# Patient Record
Sex: FEMALE | Race: AMERICAN INDIAN OR ALASKA NATIVE
[De-identification: names, ages, dates, MRNs, and addresses within clinical notes are randomized per-mention and may not be internally consistent; named-entity substitution may affect disease eponyms.]

---

## 2018-02-14 ENCOUNTER — HOSPITAL ENCOUNTER (OUTPATIENT)
Dept: HOSPITAL 5 - VAS | Age: 53
Discharge: HOME | End: 2018-02-14
Attending: FAMILY MEDICINE
Payer: COMMERCIAL

## 2018-02-14 DIAGNOSIS — M79.89: ICD-10-CM

## 2018-02-14 DIAGNOSIS — M79.605: Primary | ICD-10-CM

## 2021-03-09 ENCOUNTER — HOSPITAL ENCOUNTER (EMERGENCY)
Dept: HOSPITAL 5 - ED | Age: 56
Discharge: HOME | End: 2021-03-09
Payer: COMMERCIAL

## 2021-03-09 VITALS — DIASTOLIC BLOOD PRESSURE: 88 MMHG | SYSTOLIC BLOOD PRESSURE: 132 MMHG

## 2021-03-09 DIAGNOSIS — R51.9: Primary | ICD-10-CM

## 2021-03-09 DIAGNOSIS — Z98.51: ICD-10-CM

## 2021-03-09 DIAGNOSIS — Z90.710: ICD-10-CM

## 2021-03-09 DIAGNOSIS — I10: ICD-10-CM

## 2021-03-09 DIAGNOSIS — Z79.899: ICD-10-CM

## 2021-03-09 DIAGNOSIS — E11.9: ICD-10-CM

## 2021-03-09 PROCEDURE — 99281 EMR DPT VST MAYX REQ PHY/QHP: CPT

## 2021-03-09 NOTE — EMERGENCY DEPARTMENT REPORT
ED Headache HPI





- General


Chief Complaint: Headache


Stated Complaint: HBP; LEFT HAND TINGLING


Time Seen by Provider: 03/09/21 01:34


Source: patient





- History of Present Illness


Initial Comments: 





This 55-year-old American female with a past medical history of hypertension 

presents emerged department worried about her blood pressure she states at home 

it was in the 180s of the 110s as she grew worried that her blood pressure 

medication is not working.  She took another dose of her triamterene and then 

began to have a little tingling sensation to her hands okay to emerge department

to have a blood pressure check to see what was going on a while here emerge 

department her symptoms totally had resolved.  She reports no presyncope, no 

loss of consciousness, no fever, chills, sweats, no chest pain, no palpitations.

 States that she feels well overall and an excellent this present time 

requesting to be discharged home.  States she will return to emergency 

department should she feel that her condition is worsening.


Timing/Duration: 4-6 hours


Quality: achy


Head Injury Location: global


Recent Head Trauma: occasional headaches


Associated Symptoms: denies: facial pain, fever/chills, nausea/vomiting, nasal 

congestion, nasal drainage, sinus infection, stiff neck, vision changes


Allergies/Adverse Reactions: 


Allergies





amoxicillin Allergy (Verified 11/25/18 16:11)


   Hives


avocado Allergy (Verified 11/25/18 16:11)


   Hives


banana Allergy (Verified 11/25/18 16:11)


   Hives


kiwi Allergy (Verified 11/25/18 16:11)


   Hives


lisinopril Allergy (Verified 11/25/18 16:11)


   Swelling


orange Allergy (Verified 11/25/18 16:11)


   Hives


Latex, Natural Rubber Adverse Reaction (Verified 11/25/18 16:11)


   Anaphylaxis


peanut Adverse Reaction (Verified 11/25/18 16:11)


   Anaphylaxis











ED Review of Systems


ROS: 


Stated complaint: HBP; LEFT HAND TINGLING


Other details as noted in HPI





Comment: All other systems reviewed and negative





ED Past Medical Hx





- Past Medical History


Previous Medical History?: Yes


Hx Hypertension: Yes


Hx Diabetes: Yes





- Surgical History


Past Surgical History?: Yes


Additional Surgical History: tubal ligation, hysterectomy





- Social History


Smoking Status: Never Smoker





ED Physical Exam





- General


Limitations: No Limitations


General appearance: alert, in no apparent distress





- Head


Head exam: Present: atraumatic, normocephalic





- Eye


Eye exam: Present: normal appearance, PERRL, EOMI.  Absent: nystagmus


Pupils: Present: other (Negative funduscopic examination)





- ENT


ENT exam: Present: normal exam, normal orophraynx, mucous membranes moist





- Neck


Neck exam: Present: normal inspection





- Respiratory


Respiratory exam: Present: normal lung sounds bilaterally.  Absent: respiratory 

distress





- Cardiovascular


Cardiovascular Exam: Present: regular rate, normal rhythm.  Absent: systolic 

murmur, diastolic murmur, rubs, gallop





- GI/Abdominal


GI/Abdominal exam: Present: soft, normal bowel sounds





- Extremities Exam


Extremities exam: Present: normal inspection





- Back Exam


Back exam: Present: normal inspection.  Absent: muscle spasm, paraspinal 

tenderness, vertebral tenderness





- Neurological Exam


Neurological exam: Present: alert, oriented X3, CN II-XII intact, normal gait, 

reflexes normal.  Absent: abnormal gait, motor sensory deficit





- Psychiatric


Psychiatric exam: Present: normal affect, normal mood.  Absent: anxious, flat 

affect





- Skin


Skin exam: Present: warm, dry, intact, normal color.  Absent: rash





ED Course





                                   Vital Signs











  03/09/21





  01:16


 


Temperature 97.8 F


 


Pulse Rate 77


 


Respiratory 18





Rate 


 


Blood Pressure 132/88


 


O2 Sat by Pulse 99





Oximetry 














ED Medical Decision Making





- Medical Decision Making





55-year-old female presents emerge department for hypertension was found to have

her blood pressure normal at 130s over the 80s.  With no accompanying symptoms. 

She takes triamterene HCTZ and reports he takes medication as prescribed but had

to take a dose tonight due to increase in her and her blood pressure states that

she feels normal at no point in time did she develop any other symptoms with 

exception of a vague tingling to her feet fingers which she thought was more 

secondary to the anxiety of her having hypertension.  I did offer CT scan of the

head however she she declined stating that she felt it was unnecessary due to 

her her symptoms or lack thereof and states she will return to the emergency 

department should she feel that it is necessary


Critical care attestation.: 


If time is entered above; I have spent that time in minutes in the direct care 

of this critically ill patient, excluding procedure time.








ED Disposition


Clinical Impression: 


 Cephalgia





Disposition: DC-01 TO HOME OR SELFCARE


Is pt being admited?: No


Does the pt Need Aspirin: No


Condition: Stable


Additional Instructions: 


Blood pressure is normal range.  Please follow-up to the hospital should your 

blood pressure increases again at home or becomes present with symptoms.  We do 

understand that you have your operative no CT scan at this present time but if 

your symptoms do reemerge you can return to obtain the CT.


Referrals: 


PRIMARY CARE,MD [Primary Care Provider] - 3-5 Days





- Level of Consciousness


1a. Level of Consciousness: alert/keenly responsive





- LOC Questions


1b. LOC Questions: answers both correctly





- LOC Command


1c. LOC Commands: performs tasks correctly





- Best Gaze


2. Best Gaze: normal





- Visual


3. Visual: no visual loss





- Facial Palsy


4. Facial Palsy: normal symmetrical movement





- Motor Arm


5a. Motor Arm Left: no drift


5b. Motor Arm Right: no drift





- Motor Leg


6a. Motor Leg Left: no drift


6b. Motor Leg Right: no movement





- Limb Ataxia


7. Limb Ataxia: absent





- Sensory


8. Sensory: normal





- Best Language


9. Best Language: no aphasia





- Dysarthria


10. Dysarthria: normal





- Extinction and Inattention


11. Extinction/Inattention: no abnormality





- Scoring


Total Score: 4


Stroke Severity: Minor Stroke